# Patient Record
Sex: MALE | Race: ASIAN | URBAN - METROPOLITAN AREA
[De-identification: names, ages, dates, MRNs, and addresses within clinical notes are randomized per-mention and may not be internally consistent; named-entity substitution may affect disease eponyms.]

---

## 2020-09-11 ENCOUNTER — VIRTUAL VISIT (OUTPATIENT)
Dept: FAMILY MEDICINE | Facility: OTHER | Age: 25
End: 2020-09-11

## 2020-09-12 NOTE — PROGRESS NOTES
"Date: 2020 15:16:10  Clinician: Mary Mcrae  Clinician NPI: [id number]  Patient: J Carlos Daley  Patient : 1995  Patient Address: [address] [place] [id number]  Patient Phone: [phone]  Visit Protocol: URI  Patient Summary:  J Carlos is a 24 year old ( : 1995 ) male who initiated a [facility] Visit for COVID-19 (Coronavirus) evaluation and screening. When asked the question \"Please sign me up to receive news, health information and promotions. \", J Carlos responded \"No\".    When asked when his symptoms started, J Carlos reported that he does not have any symptoms.   He denies taking antibiotic medication in the past month and having recent facial or sinus surgery in the past 60 days.    Pertinent COVID-19 (Coronavirus) information  In the past 14 days, J Carlos has not worked in a congregate living setting.   He does not work or volunteer as healthcare worker or a  and does not work or volunteer in a healthcare facility.   J Carlos also has not lived in a congregate living setting in the past 14 days. He does not live with a healthcare worker.   J Carlos has had a close contact with a laboratory-confirmed COVID-19 patient in the last 14 days. He was not exposed at his work. Additional information about contact with COVID-19 (Coronavirus) patient as reported by the patient (free text): My girlfriend's older sister is positive for COVID. She came to visit me before knowing that her sister was positive for COVID. Her sister works in a hospital and she was here on 2020. My girlfriend was in the same room as her but did not touch her. Her sister also wore a mask the entire time.    Patient reported they are not living in the same household with a COVID-19 positive patient.  Patient denies being in an enclosed space for greater than 15 minutes with a COVID-19 patient.  Since 2019, J Carlos and has not had upper respiratory infection or influenza-like illness. Has not been " diagnosed with lab-confirmed COVID-19 test   Pertinent medical history  J Carlos needs a return to work/school note.   Weight: 180 lbs   J Carlos does not smoke or use smokeless tobacco.   Weight: 180 lbs    MEDICATIONS: No current medications, ALLERGIES: NKDA  Clinician Response:  Dear Deborahdenis,   Based on the details you've shared, you are concerned about contact with someone who may have been exposed to coronavirus (COVID-19). Based on [facility] guidelines you do not need to be tested at this time.  Testing for people who do not have symptoms is only required in certain instances, including direct contact with a person who has tested positive for COVID-19, or for those who are traveling to locations where testing is required beforehand.  Please redo an [facility] visit or call your clinic if you think we missed needed information, your exposure information has changed, or you develop symptoms.  What are the symptoms of COVID-19?  The most common symptoms are cough, fever and trouble breathing. Less common symptoms include headache, body aches, fatigue (feeling very tired), chills, sore throat, stuffy or runny nose, diarrhea (loose poop), loss of taste or smell, belly pain, and nausea or vomiting (feeling sick to your stomach or throwing up).  Where can I get more information?  [facility] -- About COVID-19: [email or web address]  CDC -- What to Do If You're Sick: www.cdc.gov/coronavirus/2019-ncov/about/steps-when-sick.html  CDC -- Ending Home Isolation: www.cdc.gov/coronavirus/2019-ncov/hcp/disposition-in-home-patients.html  CDC -- Caring for Someone: www.cdc.gov/coronavirus/2019-ncov/if-you-are-sick/care-for-someone.html  [facility] -- Interim Guidance for Hospital Discharge to Home: www.health.[facility].us/diseases/coronavirus/hcp/hospdischarge.pdf  [facility] clinical trials (COVID-19 research studies): clinicalaffairs.[facility]/umn-clinical-trials  Below are the COVID-19 hotlines at the  [place] [facility] ([facility]). Interpreters are available.  For health questions: Call 234-124-0613 or 1-849.531.5512 (7 a.m. to 7 p.m.)  For questions about schools and childcare: Call 257-360-5765 or 1-496.785.2232 (7 a.m. to 7 p.m.)    Diagnosis: Cough  Diagnosis ICD: R05